# Patient Record
Sex: MALE | Race: WHITE
[De-identification: names, ages, dates, MRNs, and addresses within clinical notes are randomized per-mention and may not be internally consistent; named-entity substitution may affect disease eponyms.]

---

## 2019-07-22 ENCOUNTER — HOSPITAL ENCOUNTER (OUTPATIENT)
Dept: HOSPITAL 91 - SDS | Age: 34
Discharge: HOME | End: 2019-07-22
Payer: COMMERCIAL

## 2019-07-22 ENCOUNTER — HOSPITAL ENCOUNTER (OUTPATIENT)
Dept: HOSPITAL 10 - SDS | Age: 34
Discharge: HOME | End: 2019-07-22
Attending: ORTHOPAEDIC SURGERY
Payer: COMMERCIAL

## 2019-07-22 VITALS — SYSTOLIC BLOOD PRESSURE: 116 MMHG | HEART RATE: 72 BPM | DIASTOLIC BLOOD PRESSURE: 67 MMHG | RESPIRATION RATE: 16 BRPM

## 2019-07-22 VITALS — HEART RATE: 87 BPM | SYSTOLIC BLOOD PRESSURE: 101 MMHG | DIASTOLIC BLOOD PRESSURE: 58 MMHG | RESPIRATION RATE: 16 BRPM

## 2019-07-22 VITALS — RESPIRATION RATE: 19 BRPM | DIASTOLIC BLOOD PRESSURE: 57 MMHG | HEART RATE: 60 BPM | SYSTOLIC BLOOD PRESSURE: 111 MMHG

## 2019-07-22 VITALS — HEART RATE: 64 BPM | SYSTOLIC BLOOD PRESSURE: 104 MMHG | RESPIRATION RATE: 16 BRPM | DIASTOLIC BLOOD PRESSURE: 55 MMHG

## 2019-07-22 VITALS — HEART RATE: 88 BPM | DIASTOLIC BLOOD PRESSURE: 62 MMHG | RESPIRATION RATE: 18 BRPM | SYSTOLIC BLOOD PRESSURE: 104 MMHG

## 2019-07-22 VITALS — SYSTOLIC BLOOD PRESSURE: 106 MMHG | HEART RATE: 64 BPM | RESPIRATION RATE: 15 BRPM | DIASTOLIC BLOOD PRESSURE: 60 MMHG

## 2019-07-22 VITALS
BODY MASS INDEX: 19.08 KG/M2 | HEIGHT: 77 IN | WEIGHT: 161.6 LBS | WEIGHT: 161.6 LBS | HEIGHT: 77 IN | BODY MASS INDEX: 19.08 KG/M2

## 2019-07-22 VITALS — DIASTOLIC BLOOD PRESSURE: 59 MMHG | HEART RATE: 64 BPM | RESPIRATION RATE: 16 BRPM | SYSTOLIC BLOOD PRESSURE: 88 MMHG

## 2019-07-22 VITALS — DIASTOLIC BLOOD PRESSURE: 56 MMHG | SYSTOLIC BLOOD PRESSURE: 107 MMHG | RESPIRATION RATE: 19 BRPM | HEART RATE: 64 BPM

## 2019-07-22 VITALS — RESPIRATION RATE: 16 BRPM | SYSTOLIC BLOOD PRESSURE: 85 MMHG | HEART RATE: 65 BPM | DIASTOLIC BLOOD PRESSURE: 45 MMHG

## 2019-07-22 VITALS — HEART RATE: 72 BPM | RESPIRATION RATE: 17 BRPM | DIASTOLIC BLOOD PRESSURE: 58 MMHG | SYSTOLIC BLOOD PRESSURE: 100 MMHG

## 2019-07-22 VITALS — SYSTOLIC BLOOD PRESSURE: 110 MMHG | DIASTOLIC BLOOD PRESSURE: 60 MMHG | RESPIRATION RATE: 18 BRPM | HEART RATE: 66 BPM

## 2019-07-22 VITALS — SYSTOLIC BLOOD PRESSURE: 90 MMHG | RESPIRATION RATE: 15 BRPM | HEART RATE: 67 BPM | DIASTOLIC BLOOD PRESSURE: 44 MMHG

## 2019-07-22 VITALS — HEART RATE: 70 BPM | RESPIRATION RATE: 18 BRPM | DIASTOLIC BLOOD PRESSURE: 62 MMHG | SYSTOLIC BLOOD PRESSURE: 108 MMHG

## 2019-07-22 VITALS — DIASTOLIC BLOOD PRESSURE: 46 MMHG | HEART RATE: 64 BPM | RESPIRATION RATE: 15 BRPM | SYSTOLIC BLOOD PRESSURE: 83 MMHG
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
## 2019-07-22 DIAGNOSIS — S82.832D: ICD-10-CM

## 2019-07-22 DIAGNOSIS — S82.872D: Primary | ICD-10-CM

## 2019-07-22 PROCEDURE — C1713 ANCHOR/SCREW BN/BN,TIS/BN: HCPCS

## 2019-07-22 PROCEDURE — 27758 TREATMENT OF TIBIA FRACTURE: CPT

## 2019-07-22 PROCEDURE — 82306 VITAMIN D 25 HYDROXY: CPT

## 2019-07-22 PROCEDURE — 73610 X-RAY EXAM OF ANKLE: CPT

## 2019-07-22 RX ADMIN — BACITRACIN ZINC NEOMYCIN SULFATE POLYMYXIN B SULFATE 1 APPLIC: 400; 3.5; 5 OINTMENT TOPICAL at 12:01

## 2019-07-22 RX ADMIN — METOCLOPRAMIDE HYDROCHLORIDE 1 MG: 10 INJECTION, SOLUTION INTRAMUSCULAR; INTRAVENOUS at 14:15

## 2019-07-22 RX ADMIN — GABAPENTIN 1 MG: 300 CAPSULE ORAL at 14:33

## 2019-07-22 RX ADMIN — BACITRACIN 1 ML: 50000 INJECTION, POWDER, FOR SOLUTION INTRAMUSCULAR at 12:01

## 2019-07-22 RX ADMIN — KETOROLAC TROMETHAMINE 1 MG: 30 INJECTION, SOLUTION INTRAMUSCULAR at 13:30

## 2019-07-22 NOTE — HPN
Date/Time of Note


Date/Time of Note


DATE: 7/22/19 


TIME: 07:59





Interval H&P Admission Note


Pt. seen H&P reviewed:  No system changes











LUIS WATERS MD              Jul 22, 2019 07:59

## 2019-07-22 NOTE — PAC
Date/Time of Note


Date/Time of Note


DATE: 7/22/19 


TIME: 14:04





Post-Anesthesia Notes


Post-Anesthesia Note


Last documented vital signs





Vital Signs


  Date      Temp  Pulse  Resp  B/P (MAP)   Pulse Ox  O2          O2 Flow    FiO2


Time                                                 Delivery    Rate


   7/22/19  98.6     72    16      116/67        98  Room Air


      1404                           (83)





Activity:  WNL


Respiratory function:  WNL


Cardiovascular function:  WNL


Mental status:  Baseline


Pain reasonably controlled:  Yes


Hydration appropriate:  Yes


Nausea/Vomiting absent:  Yes











LOPEZ SALINAS                 Jul 22, 2019 14:04

## 2019-07-22 NOTE — PREAC
Date/Time of Note


Date/Time of Note


DATE: 7/22/19 


TIME: 09:38





Anesthesia Eval and Record


Evaluation


Time Pre-Procedure Interview


DATE: 7/22/19 


TIME: 09:38


Age


34


Sex


male


NPO:  8 hrs


Preoperative diagnosis


left ankle fracture


Planned procedure


orif left ankle fracture . left ankle arthroscopy, ligament repair





Past Medical History


Past Medical History:  None





Surgery & Anesthesia Issues


No known issue





Meds


Anticoagulation:  No


Beta Blocker within 24 hr:  No


Reason Beta Blocker not given:  Pt. not on B-Blocker


No Active Prescriptions or Reported Meds


Meds reviewed:  Yes





Allergies


Coded Allergies:  


     No Known Allergy (Unverified , 7/22/19)


Allergies Reviewed:  Yes





Labs/Studies


Labs Reviewed:  Reviewed by anesthesiologist


Pregnancy test:  N/A





Pre-procedure Exam


Last vitals





Vital Signs


  Date      Temp  Pulse  Resp  B/P (MAP)   Pulse Ox  O2          O2 Flow    FiO2


Time                                                 Delivery    Rate


   7/22/19  98.6     72    16      116/67        98  Room Air


     08:25                           (83)





Airway:  Adequate mouth opening, Adequate thyromental dist


Mallampati:  Mallampati I


Teeth:  Normal


Lung:  Normal


Heart:  Normal





ASA Physical Status


ASA physical status:  1


Emergency:  None





Planned Anesthetic


General/MAC:  LMA


Nerve block:  Femoral (left), Sciatic (left)





Planned Pain Management


Single shot nerve block, Parenteral pain med





Pre-operative Attestations


Prior to commencing anesthesia and surgery, the patient was re-evaluated, there 


was verification of:


*The patient's identity


*The results of appropriate recent lab work and preoperative vital signs


*The above evaluation not changing prior to induction


*Anesthetic plan, risk benefits, alternative and complications discussed with 


patient/family; questions answered; patient/family understands, accepts and 


wishes to proceed.











LOPEZ SALINAS                 Jul 22, 2019 09:40

## 2019-07-23 NOTE — OPR
DATE OF OPERATION:  07/22/2019

 

 

PREOPERATIVE DIAGNOSIS:  Left ankle distal tibial pilon and fibular fracture.

 

POSTOPERATIVE DIAGNOSIS:  Left ankle distal tibial pilon and fibular fracture.

 

OPERATION PERFORMED:

1.  Left ankle open reduction internal fixation of distal tibial pilon fracture 

and fibular fracture with application of allograft.

2.  Application of amniotic membrane.

 

SURGEON:  Timo Gonzales MD

 

ASSISTANT:  Keo Ordonez DO

 

ANESTHESIA:  General with popliteal and adductor block.

 

ANESTHESIOLOGIST:  Dr. Wolff.

 

TOURNIQUET TIME:  130 minutes.  

 

ESTIMATED BLOOD LOSS:  Minimal.

 

TRANSFUSION:  None.

 

DRAINS:  None.

 

IMPLANTS:

1.  Arthrex anterior lateral stainless steel tibial pilon plate.

2.  Arthrex medial tibial pilon plate.

3.  Arthrex fibularlock with 2 associated locking screws.

4.  Arthrex Arthro Cell.

5.  Cancellous autograft.  

 

 

INDICATIONS:  The patient is a 34-year-old male who sustained a left ankle 

distal pilon and fibular fracture approximately 3 weeks ago.  Given the swelling

is reduced and his ankle fracture CT shows evidence of significant displacement 

and comminution, patient in need of surgery.



RISK NOTE: Patient was explained the risks and benefits of surgery and the 

patient's native language including not limited to infection, bleeding, injury 

to blood vessels, nerves, ligaments or tendons.  Risks of anesthesia, deep vein 

thrombosis and need for reduce future surgery.  Patient acknowledged these risk 

by signing the surgical consent form.

 

DESCRIPTION OF PROCEDURE:  The patient was met in the preoperative holding area 

and proper extremity was marked and confirmed with the patient and consent.  

 

The patient was brought to the operative theatre, placed supine on the operative

table and given preoperative antibiotic and regional block and general 

anesthesia.  Patient prepped and draped in the normal sterile fashion.  Time-out

taken, all present in the operating room agreed it was the correct patient, 

extremity and procedure.

 

The tourniquet was brought to 250 mmHg and attention was initially turned to the

anterior portion of the ankle where an anterolateral incision was made with care

to avoid any injury to the superficial peroneal nerve which was identified and 

protected throughout the case.  The anterior compartment and peroneal tertius 

were then retracted medially and the fracture was identified and reduced and 

held in position with K-wires.  Anterolateral plate was then placed from 

anterior lateral to posterior medial with screws placed over the fracture; both 

on the medial and lateral portion.  Locking screws were placed distally followed

by cortical screws placed proximally.  Once this was placed, attention was then 

turned to the medial aspect of the ankle and incision was made over the medial 

portion of the ankle with care to avoid any injury to the saphenous nerve and 

vein which were protected and identified and retracted.  The fracture was 

identified and reduced and then opened up and curetted and cleaned out and 

irrigated thoroughly, then packed with allograft bone graft Arthro Cell as well 

as cancellus allograft.  Then packed thoroughly with bone graft and then a 

medial plate was then placed over the medial portion of the malleolus extending 

proximally past the metaphyseal fracture.  The fracture was then fixated 

distally followed by a proximal fixation percutaneously and attention was turned

back to the anterior lateral plate and was then fixated proximally as well, the 

plate with a cortical locking screw.

  

Attention was then turned to the fibular fracture where there was a transverse 

fracture of the Vasquez B site and then a  fibulock was then placed in the typical

fashion with an incision made distally followed by opening reamer and opening 

guidewire and the placement of the implant and an outrigger followed by the 2 

2.7mm screws placed distally.  The fracture appeared to be well reduced in AP 

and lateral position.  All the wounds were then irrigated thoroughly and then 

the fractures were then shown to be well reduced in the AP, lateral and oblique 

position and all screws were placed in the well reduced position.  The wounds 

were then irrigated thoroughly and closed in layers with an amniotic membrane 

placed down initially over the anterior medial aspect of the fracture followed 

by 2-0 Vicryl, followed by 3-0 Monocryl, followed by 4-0 nylon in vertical 

mattress fashion.  At the end of the case, all sponge and needle counts were 

correct.  The patient taken to PACU in stable condition.  Toes warm and profuse.

 

ASSISTANT NOTE:  An orthopedic surgeon was necessary for this case due to the 

significantly complex nature of the pilon fracture.  Without an orthopedic 

surgeon, the case would have been longer and more complex.  Therefore it was 

important that there was an orthopedic assisting in both retracting and 

manipulating the fracture fragment, fixating and holding the fracture fragment 

in the appropriate position during the fracture reduction and during the 

surgery.  Again without an orthopedic surgeon assisting in the case, it would 

have been extensively longer and more complex.  Please compensate for the 

assistant surgeon.

 

 

Dictated By: TIMO JOE/MINERVA

DD:    07/22/2019 13:12:23

DT:    07/22/2019 15:16:19

Conf#: [id number]

DID#:  [id number]

 

MTDD